# Patient Record
Sex: FEMALE | Race: WHITE | URBAN - METROPOLITAN AREA
[De-identification: names, ages, dates, MRNs, and addresses within clinical notes are randomized per-mention and may not be internally consistent; named-entity substitution may affect disease eponyms.]

---

## 2020-09-08 ENCOUNTER — IMPORTED ENCOUNTER (OUTPATIENT)
Dept: URBAN - METROPOLITAN AREA CLINIC 50 | Facility: CLINIC | Age: 80
End: 2020-09-08

## 2020-09-10 ENCOUNTER — IMPORTED ENCOUNTER (OUTPATIENT)
Dept: URBAN - METROPOLITAN AREA CLINIC 50 | Facility: CLINIC | Age: 80
End: 2020-09-10

## 2021-04-17 ASSESSMENT — VISUAL ACUITY
OS_CC: J1+
OD_OTHER: >20/400.
OD_CC: J1+
OD_BAT: >20/400
OS_OTHER: >20/400.
OS_BAT: >20/400
OD_CC: 20/40-2
OS_CC: 20/25-2

## 2021-04-17 ASSESSMENT — TONOMETRY
OS_IOP_MMHG: 16
OD_IOP_MMHG: 16

## 2022-03-02 NOTE — PATIENT DISCUSSION
3/2/22: UNCLEAR WHY PT REMAINS ON PF QID. NO EVIDENCE OF INFLAMMATION TODAY. RECOMMEND TAPERING OF THE DROP AND START ON DORZOLAMIDE BID TO MONITOR [facility] 1 MO [facility].

## 2022-03-15 NOTE — PATIENT DISCUSSION
3/15/22: GIVEN RECURRENT PROBLEM W IOP INCREASE AND HYPHEMA, RECOMMEND REMOVAL AND EXCHANGE OF IOL. WILL IMPROVE THE PRESSURE BEFORE PROCEEDING [address]. Lucila Antes DATE 4/28/22.

## 2022-03-15 NOTE — PATIENT DISCUSSION
6/42/22: PER DR REHMAN HE HAS BEEN HAVING BOUTS OF HYPHEMA AND INFLAMMATION INTERMITTENTLY FOR SEVERAL MONTHS. SENT HERE TODAY FOR CONSIDERATION OF IOL EXCHANGE.

## 2022-04-25 NOTE — PATIENT DISCUSSION
4/25/22: IOP REMAINS ELEVATED. PT HASN'T BEEN USING HIS DROPS -RAN OUT OF DORZOLAMIDE-. IMPROVED INFLAMMATION. RECOMMEND REMOVAL OF ACIOL + IOL WITH YAMANE TECHNIQUE (LARGER WOUND/SUDDEN CHANGES IN IOP MAY SEVERELY COMPROMISE ON/[facility]).

## 2022-04-25 NOTE — PATIENT DISCUSSION
3/15/22: GIVEN RECURRENT PROBLEM W IOP INCREASE AND HYPHEMA, RECOMMEND REMOVAL AND EXCHANGE OF IOL. WILL IMPROVE THE PRESSURE BEFORE PROCEEDING [address] Rylee Singh DATE 4/28/22. [address]

## 2022-04-25 NOTE — PATIENT DISCUSSION
1/46/34: PER DR REHMAN HE HAS BEEN HAVING BOUTS OF HYPHEMA AND INFLAMMATION INTERMITTENTLY FOR SEVERAL MONTHS. SENT HERE TODAY FOR CONSIDERATION OF IOL EXCHANGE.